# Patient Record
Sex: MALE | Race: WHITE | Employment: OTHER | URBAN - NONMETROPOLITAN AREA
[De-identification: names, ages, dates, MRNs, and addresses within clinical notes are randomized per-mention and may not be internally consistent; named-entity substitution may affect disease eponyms.]

---

## 2018-02-12 ENCOUNTER — HOSPITAL ENCOUNTER (OUTPATIENT)
Dept: GENERAL RADIOLOGY | Age: 48
Discharge: HOME | End: 2018-02-14

## 2018-02-12 ENCOUNTER — OFFICE VISIT (OUTPATIENT)
Dept: PRIMARY CARE CLINIC | Age: 48
End: 2018-02-12

## 2018-02-12 VITALS
HEIGHT: 66 IN | BODY MASS INDEX: 32.14 KG/M2 | DIASTOLIC BLOOD PRESSURE: 80 MMHG | WEIGHT: 200 LBS | HEART RATE: 88 BPM | SYSTOLIC BLOOD PRESSURE: 130 MMHG | TEMPERATURE: 98.8 F | OXYGEN SATURATION: 97 %

## 2018-02-12 DIAGNOSIS — S69.92XA INJURY OF LEFT RING FINGER, INITIAL ENCOUNTER: ICD-10-CM

## 2018-02-12 DIAGNOSIS — S62.625A CLOSED DISPLACED FRACTURE OF MIDDLE PHALANX OF LEFT RING FINGER, INITIAL ENCOUNTER: Primary | ICD-10-CM

## 2018-02-12 PROCEDURE — 73130 X-RAY EXAM OF HAND: CPT

## 2018-02-12 PROCEDURE — 99213 OFFICE O/P EST LOW 20 MIN: CPT | Performed by: NURSE PRACTITIONER

## 2018-02-12 RX ORDER — PRAVASTATIN SODIUM 20 MG
20 TABLET ORAL
COMMUNITY
Start: 2017-12-05 | End: 2021-08-16

## 2018-02-12 ASSESSMENT — ENCOUNTER SYMPTOMS: RESPIRATORY NEGATIVE: 1

## 2018-02-12 NOTE — PROGRESS NOTES
[facility]             [address] [place] [address], [place], [address]                        Telephone [phone]             Fax [phone]     Beny Dickson  1970  MRN:  [id number]   Date of visit:  2/12/2018    Subjective:    Beny Dickson is a 56 y.o.  male who presents to [facility] today (2/12/2018) for evaluation of:    Chief Complaint   Patient presents with    Finger Injury     left ring finger. finger stuck in rope. bruised and tender. Hand Injury    The incident occurred 12 to 24 hours ago. The incident occurred at home. Injury mechanism: rope wrapped around left hand fingers and \"jerked\" the ring finger. The pain is present in the left fingers. The quality of the pain is described as aching. The pain is at a severity of 4/10. The pain has been constant since the incident. Associated symptoms comments: Bruising and swelling to left ring finger. Abrasions to top of 2nd, 3rd, and 5th digits. . The symptoms are aggravated by movement. He has tried ice (ibuprofen) for the symptoms. The treatment provided mild relief. He has the following problem list:  There is no problem list on file for this patient. Current medications are:  Current Outpatient Prescriptions   Medication Sig Dispense Refill    pravastatin (PRAVACHOL) 20 MG tablet Take 20 mg by mouth       No current facility-administered medications for this visit. He has No Known Allergies. Collins Spanner He  reports that he has never smoked. He has never used smokeless tobacco.      Objective:    Vitals:    02/12/18 0941   BP: 130/80   Pulse: 88   Temp: 98.8 °F (37.1 °C)   SpO2: 97%     Body mass index is 32.28 kg/m². Review of Systems   Constitutional: Negative. Respiratory: Negative. Cardiovascular: Negative. Musculoskeletal: Positive for joint swelling (left ring finger).        Physical Exam   Constitutional: He is oriented to person, place, and time. He appears well-developed and well-nourished. HENT:   Head: Normocephalic. Eyes: Pupils are equal, round, and reactive to light. Neck: Normal range of motion. Neck supple. Cardiovascular: Normal rate, regular rhythm and normal heart sounds. Pulmonary/Chest: Effort normal and breath sounds normal.   Musculoskeletal:        Left hand: He exhibits decreased range of motion (4th digit), tenderness (4th digit), bony tenderness (4th digit), deformity (4th digit) and swelling (4th digit). He exhibits normal capillary refill. Normal sensation noted. Normal strength noted. Hands:  Neurological: He is alert and oriented to person, place, and time. Skin: Skin is warm and dry. Psychiatric: He has a normal mood and affect. His behavior is normal. Thought content normal.   Nursing note and vitals reviewed. Assessment and Plan:    1. Closed displaced fracture of middle phalanx of left ring finger, initial encounter  [id number] Velázquez MD, [facility] [place]   2. Injury of left ring finger, initial encounter  XR HAND LEFT (MIN 3 VIEWS)     Splint applied to 4th digit of left hand. Take Tylenol or ibuprofen as needed for pain. Rest and elevate the affected painful area. Apply cold compresses intermittently as needed. Referral to orthopedics. Follow up with PCP or orthopedics if symptoms do not improve or worsen.         Electronically signed by Farrukh Delarosa NP on 2/12/18 at 9:53 AM

## 2018-02-14 ENCOUNTER — OFFICE VISIT (OUTPATIENT)
Dept: ORTHOPEDIC SURGERY | Age: 48
End: 2018-02-14

## 2018-02-14 VITALS
DIASTOLIC BLOOD PRESSURE: 78 MMHG | HEIGHT: 66 IN | HEART RATE: 65 BPM | SYSTOLIC BLOOD PRESSURE: 138 MMHG | WEIGHT: 200 LBS | BODY MASS INDEX: 32.14 KG/M2

## 2018-02-14 DIAGNOSIS — S62.625A CLOSED DISPLACED FRACTURE OF MIDDLE PHALANX OF LEFT RING FINGER, INITIAL ENCOUNTER: Primary | ICD-10-CM

## 2018-02-14 PROCEDURE — L3927 FO PIP DIP NO JT SPR PRE OTS: HCPCS | Performed by: FAMILY MEDICINE

## 2018-02-14 PROCEDURE — 99203 OFFICE O/P NEW LOW 30 MIN: CPT | Performed by: FAMILY MEDICINE

## 2018-02-14 NOTE — PROGRESS NOTES
disorder. PHYSICAL EXAM:    SKIN:  Intact without rashes, lesions or ulcerations. No obvious deformity or swelling. EYES:  Extraocular muscles intact. MOUTH: Oral mucosa moist.  No perioral lesions. PULM:  Respirations unlabored and regular. VASC:  Capillary refill less than 2 seconds. Hand/Wrist Exam  ROM:  Full flexor and extensor tendon function intact   Finger, hand, and wrist range of motion are Reduced resulting in Decreased range of motion of the left ring finger  Inspection-Deformity: yes  Palpation-Tenderness: Soft tissue swelling and tenderness over the middle phalanx of the left ring finger  There is is not thenar and is not interosseous atrophy. Strength- WNL  NEURO: Radial, ulnar, and median nerves are intact to motor and sensory testing. Two point discrimination is less than six mm. There is not decreased sensation to light touch and pinprick in the median and ulnar nerve distribution. CTS: Tinel's test at the wrist is negative. Flexion compression test negative  Phalen's test is negative. Finkelstein's test is negative. Elbow:  Range of motion and strength about the elbow is intact. Tinel's test is negative at the elbow. Cerv:  Full pain free range of motion with a negative Spurling's test.    RADIOLOGY: No results found. 3 views of the left hand were ordered,  independently visualized by me, and discussed with patient. Findings: Left hand radiographs demonstrate a minimally displaced fracture of the middle phalanx of the left ring finger without intra-articular extension    IMPRESSION:     1.  Closed displaced fracture of middle phalanx of left ring finger, initial encounter        PLAN:   We discussed some of the etiologies and natural histories of     ICD-10-CM ICD-9-CM    1. Closed displaced fracture of middle phalanx of left ring finger, initial encounter S62.625A 816.01      We discussed the various treatment alternatives including anti-inflammatory

## 2018-02-26 DIAGNOSIS — S62.655S: Primary | ICD-10-CM

## 2021-07-28 ENCOUNTER — HOSPITAL ENCOUNTER (OUTPATIENT)
Dept: MRI IMAGING | Age: 51
Discharge: HOME | End: 2021-07-30
Payer: COMMERCIAL

## 2021-07-28 DIAGNOSIS — S83.241A ACUTE MEDIAL MENISCUS TEAR OF RIGHT KNEE, INITIAL ENCOUNTER: ICD-10-CM

## 2021-07-28 PROCEDURE — 73721 MRI JNT OF LWR EXTRE W/O DYE: CPT

## 2021-08-11 DIAGNOSIS — M25.561 RIGHT KNEE PAIN, UNSPECIFIED CHRONICITY: Primary | ICD-10-CM

## 2021-08-16 ENCOUNTER — OFFICE VISIT (OUTPATIENT)
Dept: ORTHOPEDIC SURGERY | Age: 51
End: 2021-08-16
Payer: COMMERCIAL

## 2021-08-16 ENCOUNTER — HOSPITAL ENCOUNTER (OUTPATIENT)
Dept: GENERAL RADIOLOGY | Age: 51
Discharge: HOME | End: 2021-08-18
Payer: COMMERCIAL

## 2021-08-16 VITALS
HEIGHT: 66 IN | DIASTOLIC BLOOD PRESSURE: 76 MMHG | OXYGEN SATURATION: 98 % | BODY MASS INDEX: 32.14 KG/M2 | WEIGHT: 200 LBS | HEART RATE: 74 BPM | SYSTOLIC BLOOD PRESSURE: 120 MMHG

## 2021-08-16 DIAGNOSIS — M25.561 RIGHT KNEE PAIN, UNSPECIFIED CHRONICITY: ICD-10-CM

## 2021-08-16 DIAGNOSIS — M25.00 HEMARTHROSIS: Primary | ICD-10-CM

## 2021-08-16 PROCEDURE — 73562 X-RAY EXAM OF KNEE 3: CPT

## 2021-08-16 PROCEDURE — 99203 OFFICE O/P NEW LOW 30 MIN: CPT | Performed by: ORTHOPAEDIC SURGERY

## 2021-08-16 RX ORDER — LISINOPRIL 20 MG/1
20 TABLET ORAL DAILY
COMMUNITY
End: 2021-09-03 | Stop reason: SDUPTHER

## 2021-08-24 ENCOUNTER — HOSPITAL ENCOUNTER (OUTPATIENT)
Dept: LAB | Age: 51
Discharge: HOME | End: 2021-08-24
Payer: COMMERCIAL

## 2021-08-24 ENCOUNTER — OFFICE VISIT (OUTPATIENT)
Dept: ONCOLOGY | Age: 51
End: 2021-08-24
Payer: COMMERCIAL

## 2021-08-24 VITALS
SYSTOLIC BLOOD PRESSURE: 108 MMHG | HEART RATE: 54 BPM | HEIGHT: 66 IN | BODY MASS INDEX: 31.34 KG/M2 | WEIGHT: 195 LBS | OXYGEN SATURATION: 94 % | TEMPERATURE: 97.7 F | DIASTOLIC BLOOD PRESSURE: 70 MMHG

## 2021-08-24 DIAGNOSIS — M25.00 HEMARTHROSIS: Primary | ICD-10-CM

## 2021-08-24 DIAGNOSIS — M25.00 HEMARTHROSIS: ICD-10-CM

## 2021-08-24 LAB
ABSOLUTE EOS #: 0.16 K/UL (ref 0–0.44)
ABSOLUTE IMMATURE GRANULOCYTE: 0.03 K/UL (ref 0–0.3)
ABSOLUTE LYMPH #: 1.85 K/UL (ref 1.1–3.7)
ABSOLUTE MONO #: 0.67 K/UL (ref 0.1–1.2)
ABSOLUTE RETIC #: 0.12 M/UL (ref 0.03–0.08)
ALBUMIN SERPL-MCNC: 4.7 G/DL (ref 3.5–5.2)
ALBUMIN/GLOBULIN RATIO: 1.9 (ref 1–2.5)
ALP BLD-CCNC: 62 U/L (ref 40–129)
ALT SERPL-CCNC: 47 U/L (ref 5–41)
ANION GAP SERPL CALCULATED.3IONS-SCNC: 9 MMOL/L (ref 9–17)
AST SERPL-CCNC: 23 U/L
BASOPHILS # BLD: 1 % (ref 0–2)
BASOPHILS ABSOLUTE: 0.05 K/UL (ref 0–0.2)
BILIRUB SERPL-MCNC: 0.43 MG/DL (ref 0.3–1.2)
BUN BLDV-MCNC: 13 MG/DL (ref 6–20)
BUN/CREAT BLD: 15 (ref 9–20)
CALCIUM SERPL-MCNC: 9.4 MG/DL (ref 8.6–10.4)
CHLORIDE BLD-SCNC: 104 MMOL/L (ref 98–107)
CO2: 23 MMOL/L (ref 20–31)
CREAT SERPL-MCNC: 0.87 MG/DL (ref 0.7–1.2)
DIFFERENTIAL TYPE: ABNORMAL
EOSINOPHILS RELATIVE PERCENT: 3 % (ref 1–4)
GFR AFRICAN AMERICAN: >60 ML/MIN
GFR NON-AFRICAN AMERICAN: >60 ML/MIN
GFR SERPL CREATININE-BSD FRML MDRD: ABNORMAL ML/MIN/{1.73_M2}
GFR SERPL CREATININE-BSD FRML MDRD: ABNORMAL ML/MIN/{1.73_M2}
GLUCOSE BLD-MCNC: 107 MG/DL (ref 70–99)
HCT VFR BLD CALC: 41.3 % (ref 40.7–50.3)
HEMOGLOBIN: 13.8 G/DL (ref 13–17)
IMMATURE GRANULOCYTES: 1 %
IMMATURE RETIC FRACT: 11.9 % (ref 2.7–18.3)
LYMPHOCYTES # BLD: 31 % (ref 24–43)
MCH RBC QN AUTO: 31.2 PG (ref 25.2–33.5)
MCHC RBC AUTO-ENTMCNC: 33.4 G/DL (ref 25.2–33.5)
MCV RBC AUTO: 93.2 FL (ref 82.6–102.9)
MONOCYTES # BLD: 11 % (ref 3–12)
NRBC AUTOMATED: 0 PER 100 WBC
PDW BLD-RTO: 11.9 % (ref 11.8–14.4)
PLATELET # BLD: 252 K/UL (ref 138–453)
PLATELET ESTIMATE: ABNORMAL
PMV BLD AUTO: 8.7 FL (ref 8.1–13.5)
POTASSIUM SERPL-SCNC: 4.4 MMOL/L (ref 3.7–5.3)
RBC # BLD: 4.43 M/UL (ref 4.21–5.77)
RBC # BLD: ABNORMAL 10*6/UL
RETIC %: 2.7 % (ref 0.5–1.9)
RETIC HEMOGLOBIN: 36.5 PG (ref 28.2–35.7)
SEG NEUTROPHILS: 53 % (ref 36–65)
SEGMENTED NEUTROPHILS ABSOLUTE COUNT: 3.18 K/UL (ref 1.5–8.1)
SODIUM BLD-SCNC: 136 MMOL/L (ref 135–144)
TOTAL PROTEIN: 7.2 G/DL (ref 6.4–8.3)
WBC # BLD: 5.9 K/UL (ref 3.5–11.3)
WBC # BLD: ABNORMAL 10*3/UL

## 2021-08-24 PROCEDURE — 85613 RUSSELL VIPER VENOM DILUTED: CPT

## 2021-08-24 PROCEDURE — 85246 CLOT FACTOR VIII VW ANTIGEN: CPT

## 2021-08-24 PROCEDURE — 85245 CLOT FACTOR VIII VW RISTOCTN: CPT

## 2021-08-24 PROCEDURE — 86147 CARDIOLIPIN ANTIBODY EA IG: CPT

## 2021-08-24 PROCEDURE — 36415 COLL VENOUS BLD VENIPUNCTURE: CPT

## 2021-08-24 PROCEDURE — 99204 OFFICE O/P NEW MOD 45 MIN: CPT | Performed by: INTERNAL MEDICINE

## 2021-08-24 PROCEDURE — 85610 PROTHROMBIN TIME: CPT

## 2021-08-24 PROCEDURE — 85045 AUTOMATED RETICULOCYTE COUNT: CPT

## 2021-08-24 PROCEDURE — 85730 THROMBOPLASTIN TIME PARTIAL: CPT

## 2021-08-24 PROCEDURE — 80053 COMPREHEN METABOLIC PANEL: CPT

## 2021-08-24 PROCEDURE — 85025 COMPLETE CBC W/AUTO DIFF WBC: CPT

## 2021-08-24 PROCEDURE — 85240 CLOT FACTOR VIII AHG 1 STAGE: CPT

## 2021-08-24 PROCEDURE — 85635 REPTILASE TEST: CPT

## 2021-08-25 LAB — SURGICAL PATHOLOGY REPORT: NORMAL

## 2021-08-25 NOTE — PROGRESS NOTES
Michael Mcnulty                                                                                                                  8/24/2021  MRN:   [id number]  YOB: 1970  PCP:                           Mayte Loyd MD  Referring Physician: No ref. provider found  Treating Physician Name: Duy Back MD      Reason for consultation:  Chief Complaint   Patient presents with    New Patient     Hemarthrosis right knee      Current problems:  Right knee hemarthrosis    Active and recent treatments:  Work-up in progress    Summary of Case/History:    Michael Mcnulty a 53 y.o.male is a patient with right knee hemarthrosis presents to the clinic to establish care and for further work-up and evaluation. Patient underwent right knee arthroplasty for a meniscal tear about 3 years ago. Patient recovery from the surgery was prolonged due to knee swelling and bleeding into the knee post surgery. Patient more recently started noticing swelling of his right knee again and underwent arthrocentesis which revealed bloody effusion. Subsequently patient is referred to the clinic to rule out any bleeding disorder. Patient states that his sister was told that she has a bleeding disorder and received some form of medication to prevent bleeding before surgery. Patient cannot give details about the condition his sister has. Patient has been active throughout his life he has had minor surgeries done including circumcision visit on tube extraction without any extensive bleeding. Patient denies bloody urine bleeding from his gums. At the time of evaluation he does not have any bruises. However he does feel that he bleeds more when he cuts himself. Denies any other episodes of bleeding into joints. Denies any bleeding into the joints with minimal trauma. Patient does not take NSAIDs typically. Past Medical History:   History reviewed. No pertinent past medical history.     Past Surgical History:   History reviewed. No pertinent surgical history. Patient Family Social History:     Social History     Socioeconomic History    Marital status:      Spouse name: None    Number of children: None    Years of education: None    Highest education level: None   Occupational History    None   Tobacco Use    Smoking status: Never Smoker    Smokeless tobacco: Never Used   Substance and Sexual Activity    Alcohol use: None    Drug use: None    Sexual activity: None   Other Topics Concern    None   Social History Narrative    None     Social Determinants of Health     Financial Resource Strain:     Difficulty of Paying Living Expenses:    Food Insecurity:     Worried About Running Out of Food in the Last Year:     Ran Out of Food in the Last Year:    Transportation Needs:     Lack of Transportation (Medical):  Lack of Transportation (Non-Medical):    Physical Activity:     Days of Exercise per Week:     Minutes of Exercise per Session:    Stress:     Feeling of Stress :    Social Connections:     Frequency of Communication with Friends and Family:     Frequency of Social Gatherings with Friends and Family:     Attends [employer] Services:     Active Member of Clubs or Organizations:     Attends Club or Organization Meetings:     Marital Status:    Intimate Partner Violence:     Fear of Current or Ex-Partner:     Emotionally Abused:     Physically Abused:     Sexually Abused:      History reviewed. No pertinent family history. Current Medications:     Current Outpatient Medications   Medication Sig Dispense Refill    lisinopril (PRINIVIL;ZESTRIL) 20 MG tablet Take 20 mg by mouth daily       No current facility-administered medications for this visit. Allergies:   Patient has no known allergies. Review of Systems:    Constitutional: No fever or chills.  No night sweats, no weight loss   Eyes: No eye discharge, double vision, or eye pain   HEENT: negative for sore mouth, sore throat, hoarseness and voice change   Respiratory: negative for cough , sputum, dyspnea, wheezing, hemoptysis, chest pain   Cardiovascular: negative for chest pain, dyspnea, palpitations, orthopnea, PND   Gastrointestinal: negative for nausea, vomiting, diarrhea, constipation, abdominal pain, Dysphagia, hematemesis and hematochezia   Genitourinary: negative for frequency, dysuria, nocturia, urinary incontinence, and hematuria   Integument: negative for rash, skin lesions, bruises. Hematologic/Lymphatic: negative for easy bruising, bleeding, lymphadenopathy, or petechiae   Endocrine: negative for heat or cold intolerance,weight changes, change in bowel habits and hair loss   Musculoskeletal: Positive for right knee swelling and pain  Neurological: negative for headaches, dizziness, seizures, weakness, numbness        Physical Exam:    Vitals: /70 (Site: Right Upper Arm, Position: Sitting, Cuff Size: Large Adult)   Pulse 54   Temp 97.7 °F (36.5 °C)   Ht 5' 6\" (1.676 m)   Wt 195 lb (88.5 kg)   SpO2 94%   BMI 31.47 kg/m²   General appearance - well appearing, no in pain or distress  Mental status - AAO X3  Eyes - pupils equal and reactive, extraocular eye movements intact  Mouth - mucous membranes moist, pharynx normal without lesions  Neck - supple, no significant adenopathy  Lymphatics - no palpable lymphadenopathy, no hepatosplenomegaly  Chest - clear to auscultation, no wheezes, rales or rhonchi, symmetric air entry  Heart - normal rate, regular rhythm, normal S1, S2, no murmurs  Abdomen - soft, nontender, nondistended, no masses or organomegaly  Neurological - alert, oriented, normal speech, no focal findings or movement disorder noted  Extremities -negative edema.   Right knee swelling  Skin - normal coloration and turgor, no rashes, no suspicious skin lesions noted       DATA:    CBC:   Recent Labs     08/24/21  0957   WBC 5.9   HGB 13.8        BMP:    Recent Labs     08/24/21  0957    K 4.4      CO2 23   BUN 13   CREATININE 0.87   GLUCOSE 107*     Hepatic:   Recent Labs     08/24/21  0957   AST 23   ALT 47*   BILITOT 0.43   ALKPHOS 62     INR:   Recent Labs     08/24/21  0957   INR 1.0     PTT:No results for input(s): PTT in the last 72 hours. Impression:  Recurrent right knee hemarthrosis    Plan:  I had a detailed discussion with the patient and personally went over results of lab work-up imaging studies and other relevant clinical data. Reviewed records from outside facility. Patient recently underwent arthrocentesis with finding of bloody effusion. Discussed differential diagnosis of hemarthrosis Which can be due to trauma bleeding disorders osteoarthritis neurological disorder vascular disorder as well as tumors. Patient underwent MRI of the right knee but did not show any tumor but showed complex tear involving horn of the medial meniscus showed a large joint effusion. Otherwise patient does not give any history of bleeding disorder he has had minor surgeries including circumcision as well as wisdom tooth extraction without any complications. Upon evaluation he does not have any bruises. Does not have any other history of bloody effusion in other joints. Does not take blood thinners. Clinically I suspect patient's recurrent right knee hemarthrosis is more likely due to local structural etiology as opposed to bleeding disorder however given history of possible bleeding disorder in patient's sister I will proceed with work-up including screening studies for coagulopathy as well as reptilase test.  I will also obtain testing for von Willebrand disease and platelet function assay. We will see patient back in office with results of the above test to discuss further treatment plan in the meanwhile I also strongly encouraged the patient to get in touch with assistant find further details regarding the diagnosis of her condition.   The patient and his wife expressed understanding the plan and were in agreement. Giana Marcelo MD      I spent more than 60 minutes examining, evaluating, reviewing data, counseling the patient and coordinating care. Greater than 50% of time was spent face-to-face with the patient this note is created with the assistance of a speech recognition program.  While intending to generate a document that actually reflects the content of the visit, the document can still have some errors including those of syntax and sound a like substitutions which may escape proof reading. It such instances, actual meaning can be extrapolated by contextual diversion.

## 2021-08-26 LAB
PATHOLOGIST REVIEW: NORMAL
REPTILASE TIME: 20.2 SEC
REPTILASE(CORRECTED): NORMAL SEC
RISTOCETIN CO-FACTOR: 97 % (ref 51–215)
VON WILLEBRAND AG: 123 % (ref 52–214)

## 2021-08-31 LAB
ANTICARDIOLIPIN IGA ANTIBODY: 2.1 APL (ref 0–14)
ANTICARDIOLIPIN IGG ANTIBODY: <0.5 GPL (ref 0–10)
CARDIOLIPIN AB IGM: <0.8 MPL (ref 0–10)
DILUTE RUSSELL VIPER VENOM TIME: NORMAL
FACTOR VIII ACTIVITY: 148 % (ref 50–150)
INR BLD: 1
LUPUS ANTICOAG: NORMAL
PARTIAL THROMBOPLASTIN TIME: 26.4 SEC (ref 20.5–30.5)
PROTHROMBIN TIME: 11 SEC (ref 9.1–12.3)

## 2021-09-03 ENCOUNTER — OFFICE VISIT (OUTPATIENT)
Dept: ONCOLOGY | Age: 51
End: 2021-09-03
Payer: COMMERCIAL

## 2021-09-03 VITALS
SYSTOLIC BLOOD PRESSURE: 126 MMHG | BODY MASS INDEX: 31.63 KG/M2 | HEART RATE: 70 BPM | DIASTOLIC BLOOD PRESSURE: 80 MMHG | HEIGHT: 66 IN | WEIGHT: 196.8 LBS | OXYGEN SATURATION: 98 % | TEMPERATURE: 98.6 F

## 2021-09-03 DIAGNOSIS — I10 HYPERTENSION, UNSPECIFIED TYPE: Primary | ICD-10-CM

## 2021-09-03 PROCEDURE — 99213 OFFICE O/P EST LOW 20 MIN: CPT | Performed by: INTERNAL MEDICINE

## 2021-09-03 RX ORDER — LISINOPRIL 20 MG/1
20 TABLET ORAL DAILY
Qty: 30 TABLET | Refills: 0 | Status: SHIPPED | OUTPATIENT
Start: 2021-09-03 | End: 2021-09-16 | Stop reason: SDUPTHER

## 2021-09-03 NOTE — PROGRESS NOTES
Bishop Kasper                                                                                                                  9/3/2021  MRN:   [id number]  YOB: 1970  PCP:                           Beni Aviles MD  Referring Physician: No ref. provider found  Treating Physician Name: Giana Marcelo MD      Reason for visit:  Chief Complaint   Patient presents with    Follow-up     Hemarthrosis, review labs     Current problems:  Right knee hemarthrosis    Summary of Case/History:    Bishop Kasper a 53 y.o.male is a patient with right knee hemarthrosis presents to the clinic to establish care and for further work-up and evaluation. Patient underwent right knee arthroplasty for a meniscal tear about 3 years ago. Patient recovery from the surgery was prolonged due to knee swelling and bleeding into the knee post surgery. Patient more recently started noticing swelling of his right knee again and underwent arthrocentesis which revealed bloody effusion. Subsequently patient is referred to the clinic to rule out any bleeding disorder. Patient states that his sister was told that she has a bleeding disorder and received some form of medication to prevent bleeding before surgery. Patient cannot give details about the condition his sister has. Patient has been active throughout his life he has had minor surgeries done including circumcision visit on tube extraction without any extensive bleeding. Patient denies bloody urine bleeding from his gums. At the time of evaluation he does not have any bruises. However he does feel that he bleeds more when he cuts himself. Denies any other episodes of bleeding into joints. Denies any bleeding into the joints with minimal trauma. Patient does not take NSAIDs typically. Interim History:    Patient presents to the clinic for a follow-up visit and to discuss results of his lab work-up and other relevant clinical data.   Still complains of pain in his knee. Denies any bleeding episode. Past Medical History:   arthritis     Past Surgical History:  Knee arthroplasty     Patient Family Social History:     Social History     Socioeconomic History    Marital status:      Spouse name: None    Number of children: None    Years of education: None    Highest education level: None   Occupational History    None   Tobacco Use    Smoking status: Never Smoker    Smokeless tobacco: Never Used   Substance and Sexual Activity    Alcohol use: None    Drug use: None    Sexual activity: None   Other Topics Concern    None   Social History Narrative    None     Social Determinants of Health     Financial Resource Strain:     Difficulty of Paying Living Expenses:    Food Insecurity:     Worried About Running Out of Food in the Last Year:     Ran Out of Food in the Last Year:    Transportation Needs:     Lack of Transportation (Medical):  Lack of Transportation (Non-Medical):    Physical Activity:     Days of Exercise per Week:     Minutes of Exercise per Session:    Stress:     Feeling of Stress :    Social Connections:     Frequency of Communication with Friends and Family:     Frequency of Social Gatherings with Friends and Family:     Attends [employer] Services:     Active Member of Clubs or Organizations:     Attends Club or Organization Meetings:     Marital Status:    Intimate Partner Violence:     Fear of Current or Ex-Partner:     Emotionally Abused:     Physically Abused:     Sexually Abused:      No family history on file. Current Medications:     Current Outpatient Medications   Medication Sig Dispense Refill    lisinopril (PRINIVIL;ZESTRIL) 20 MG tablet Take 20 mg by mouth daily       No current facility-administered medications for this visit. Allergies:   Patient has no known allergies. Review of Systems:    Constitutional: No fever or chills.  No night sweats, no weight loss   Eyes: No eye discharge, hospital encounter of 08/24/21   Von Willebrand Antigen   Result Value Ref Range    Von Willebrand Ag 123 52 - 214 %   Factor 8 Ristocetin Cofactor   Result Value Ref Range    Ristocetin Co-Factor 97 51 - 215 %   Reptilase Time   Result Value Ref Range    Reptilase Tm 20.2 <=21.9 sec    Reptilase(corrected) Not Applicable <=24.5 sec   Comprehensive Metabolic Panel   Result Value Ref Range    Glucose 107 (H) 70 - 99 mg/dL    BUN 13 6 - 20 mg/dL    CREATININE 0.87 0.70 - 1.20 mg/dL    Bun/Cre Ratio 15 9 - 20    Calcium 9.4 8.6 - 10.4 mg/dL    Sodium 136 135 - 144 mmol/L    Potassium 4.4 3.7 - 5.3 mmol/L    Chloride 104 98 - 107 mmol/L    CO2 23 20 - 31 mmol/L    Anion Gap 9 9 - 17 mmol/L    Alkaline Phosphatase 62 40 - 129 U/L    ALT 47 (H) 5 - 41 U/L    AST 23 <40 U/L    Total Bilirubin 0.43 0.3 - 1.2 mg/dL    Total Protein 7.2 6.4 - 8.3 g/dL    Albumin 4.7 3.5 - 5.2 g/dL    Albumin/Globulin Ratio 1.9 1.0 - 2.5    GFR Non-African American >60 >60 mL/min    GFR African American >60 >60 mL/min    GFR Comment          GFR Staging NOT REPORTED    Path Review, Smear   Result Value Ref Range    Pathologist Review       Reviewed by pathologist:  Binta Ramirez.  ANGIE Jauregui   CBC Auto Differential   Result Value Ref Range    WBC 5.9 3.5 - 11.3 k/uL    RBC 4.43 4.21 - 5.77 m/uL    Hemoglobin 13.8 13.0 - 17.0 g/dL    Hematocrit 41.3 40.7 - 50.3 %    MCV 93.2 82.6 - 102.9 fL    MCH 31.2 25.2 - 33.5 pg    MCHC 33.4 25.2 - 33.5 g/dL    RDW 11.9 11.8 - 14.4 %    Platelets 307 749 - 746 k/uL    MPV 8.7 8.1 - 13.5 fL    NRBC Automated 0.0 0.0 per 100 WBC    Differential Type NOT REPORTED     Seg Neutrophils 53 36 - 65 %    Lymphocytes 31 24 - 43 %    Monocytes 11 3 - 12 %    Eosinophils % 3 1 - 4 %    Basophils 1 0 - 2 %    Immature Granulocytes 1 (H) 0 %    Segs Absolute 3.18 1.50 - 8.10 k/uL    Absolute Lymph # 1.85 1.10 - 3.70 k/uL    Absolute Mono # 0.67 0.10 - 1.20 k/uL    Absolute Eos # 0.16 0.00 - 0.44 k/uL    Basophils Absolute 0.05 0.00 - 0.20 k/uL    Absolute Immature Granulocyte 0.03 0.00 - 0.30 k/uL    WBC Morphology NOT REPORTED     RBC Morphology NOT REPORTED     Platelet Estimate NOT REPORTED    Factor 8 Assay   Result Value Ref Range    Factor VIII Activity 148 50 - 150 %   Lupus Anticoagulant   Result Value Ref Range    Anticardiolipin IgA 2.1 0.0 - 14.0 APL    Anticardiolipin IgG <0.5 0.0 - 10.0 GPL    Cardiolipin Ab IgM <0.8 0.0 - 10.0 MPL    Dilute Viper Venom Time NEGATIVE for Lupus Anticoagulant NEGATIVE for Lupus Anticoagulant    Lupus Anticoag NOT REPORTED     Protime 11.0 9.1 - 12.3 sec    INR 1.0     PTT 26.4 20.5 - 30.5 sec   Reticulocytes   Result Value Ref Range    Retic % 2.7 (H) 0.5 - 1.9 %    Absolute Retic # 0.120 (H) 0.030 - 0.080 M/uL    Immature Retic Fract 11.900 2.7 - 18.3 %    Retic Hemoglobin 36.5 (H) 28.2 - 35.7 pg   Surgical Pathology   Result Value Ref Range    Surgical Pathology Report       [id number]  [facility] [employer] [facility] [employer] [facility]  [facility]  ANATOMIC PATHOLOGY  [address] [place] [address], [place] [address]. [place], [address]  [phone]  Fax: [phone]  SURGICAL PATHOLOGY CONSULTATION    Patient Name: Ravin Nelson  MR#: [id number]  Specimen #[id number]    Procedures/Addenda  PERIPHERAL BLOOD REPORT     Date Ordered:     8/25/2021     Status:  Signed Out       Date Complete:     8/25/2021     By: Susannah Palafox. Juan Carlos Waters D.O. Date Reported:     8/25/2021       INTERPRETATION  PERIPHERAL BLOOD:  RED BLOOD CELL COUNTS WITHIN THE REFERENCE RANGE WITH NORMAL  MORPHOLOGY  WHITE BLOOD CELLS SHOW NORMAL MORPHOLOGY  PLATELETS SHOW NORMAL MORPHOLOGY    RESULTS-COMMENTS  PERIPHERAL BLOOD STUDY    CBC: Please see the electronic health record for CBC parameters  ([id number], 08/24/2021, 09:57). PLATELETS: Platelets show normal morphology. LEUKOCYTES: White blood cells show normal morphology. There are no  blasts. ERYTHROCYTES: Red blood cells brenton w normal morphology. Note: The electronic health record is reviewed. Neelima Caba D.O. Source:  1: PERIPHERAL BLOOD FOR REVIEW BY PATHOLOGIST         XR KNEE RIGHT (3 VIEWS)    Result Date: 8/16/2021  EXAMINATION: THREE XRAY VIEWS OF THE RIGHT KNEE 8/16/2021 8:32 am COMPARISON: July 27, 2021 right knee series HISTORY: ORDERING SYSTEM PROVIDED HISTORY: Right knee pain, unspecified chronicity TECHNOLOGIST PROVIDED HISTORY: pain Reason for Exam: right knee pain x 2 weeks, hx of torn meniscus Acuity: Chronic Type of Exam: Initial FINDINGS: There is minimal patellofemoral and medial tibiofemoral joint space compromise with minimal anterior patellar spurring There is a small joint effusion There is no definite fracture, dislocation, more significant degenerative/erosive change, radiopaque foreign body or bony destructive lesion     Minimal bicompartmental degenerative changes Small joint effusion       Impression:  Recurrent right knee hemarthrosis    Plan:  I had a detailed discussion with the patient and personally went over results of lab work-up imaging studies and other relevant clinical data. Reviewed results of lab work-up which is not suggestive of any bleeding disorder. Clinically I do not believe patient has a bleeding diathesis. Okay to proceed with knee procedure from hematology oncology standpoint    Discussed differential diagnosis of hemarthrosis Which can be due to trauma bleeding disorders osteoarthritis neurological disorder vascular disorder as well as tumors. Patient underwent MRI of the right knee but did not show any tumor but showed complex tear involving horn of the medial meniscus showed a large joint effusion. Otherwise patient does not give any history of bleeding disorder he has had minor surgeries including circumcision as well as wisdom tooth extraction without any complications. Upon evaluation he does not have any bruises.   Does not have any other history of bloody effusion in other joints. Does not take blood thinners. Clinically I suspect patient's recurrent right knee hemarthrosis is more likely due to local structural etiology as opposed to bleeding disorder    We will see patient back in office on as-needed basis    Ananya Guevara MD      This note is created with the assistance of a speech recognition program.  While intending to generate a document that actually reflects the content of the visit, the document can still have some errors including those of syntax and sound a like substitutions which may escape proof reading. It such instances, actual meaning can be extrapolated by contextual diversion.   N.

## 2021-09-13 ENCOUNTER — OFFICE VISIT (OUTPATIENT)
Dept: ORTHOPEDIC SURGERY | Age: 51
End: 2021-09-13
Payer: COMMERCIAL

## 2021-09-13 VITALS
DIASTOLIC BLOOD PRESSURE: 78 MMHG | HEIGHT: 66 IN | SYSTOLIC BLOOD PRESSURE: 124 MMHG | BODY MASS INDEX: 31.34 KG/M2 | HEART RATE: 78 BPM | WEIGHT: 195 LBS

## 2021-09-13 DIAGNOSIS — M17.10 ARTHRITIS OF KNEE: Primary | ICD-10-CM

## 2021-09-13 PROCEDURE — 99213 OFFICE O/P EST LOW 20 MIN: CPT | Performed by: ORTHOPAEDIC SURGERY

## 2021-09-13 PROCEDURE — 20610 DRAIN/INJ JOINT/BURSA W/O US: CPT | Performed by: ORTHOPAEDIC SURGERY

## 2021-09-13 RX ORDER — LIDOCAINE HYDROCHLORIDE 10 MG/ML
2 INJECTION, SOLUTION INFILTRATION; PERINEURAL ONCE
Status: COMPLETED | OUTPATIENT
Start: 2021-09-13 | End: 2021-09-13

## 2021-09-13 RX ORDER — BUPIVACAINE HYDROCHLORIDE 5 MG/ML
2 INJECTION, SOLUTION PERINEURAL ONCE
Status: COMPLETED | OUTPATIENT
Start: 2021-09-13 | End: 2021-09-13

## 2021-09-13 RX ORDER — METHYLPREDNISOLONE ACETATE 40 MG/ML
40 INJECTION, SUSPENSION INTRA-ARTICULAR; INTRALESIONAL; INTRAMUSCULAR; SOFT TISSUE ONCE
Status: COMPLETED | OUTPATIENT
Start: 2021-09-13 | End: 2021-09-13

## 2021-09-13 RX ADMIN — METHYLPREDNISOLONE ACETATE 40 MG: 40 INJECTION, SUSPENSION INTRA-ARTICULAR; INTRALESIONAL; INTRAMUSCULAR; SOFT TISSUE at 09:37

## 2021-09-13 RX ADMIN — BUPIVACAINE HYDROCHLORIDE 10 MG: 5 INJECTION, SOLUTION PERINEURAL at 09:36

## 2021-09-13 RX ADMIN — LIDOCAINE HYDROCHLORIDE 2 ML: 10 INJECTION, SOLUTION INFILTRATION; PERINEURAL at 09:37

## 2021-09-13 NOTE — PROGRESS NOTES
Orthopedic Office Note  [facility] [address]  [address] [facility], [address]  [facility] [address] [phone]      CHIEF COMPLAINT:    Chief Complaint   Patient presents with    Knee Pain     rech right knee       HISTORY OF PRESENT ILLNESS:      The patient is a 53 y.o. male  who presents today for follow-up of his right knee hemarthrosis and osteoarthritis. He is seen the hematologist who work him up for a bleeding disorder which was normal.  Office notes from August 24 of this year and September of this year from Dr. Bret Hills were reviewed. He continues to have right knee pain although significantly improved from his previous visit. Past Medical History:    History reviewed. No pertinent past medical history. Past Surgical History:    History reviewed. No pertinent surgical history. Medications Prior to Admission:   Current Outpatient Medications   Medication Sig Dispense Refill    lisinopril (PRINIVIL;ZESTRIL) 20 MG tablet Take 1 tablet by mouth daily 30 tablet 0     No current facility-administered medications for this visit. Allergies:  Patient has no known allergies. Social History:   Social History     Tobacco Use   Smoking Status Never Smoker   Smokeless Tobacco Never Used     Social History     Substance and Sexual Activity   Alcohol Use None     Social History     Substance and Sexual Activity   Drug Use Not on file       Family History:  History reviewed. No pertinent family history. REVIEW OF SYSTEMS:  Please see the ROS form attached to today's encounter. I have reviewed it with the patient during the visit. All other systems were reviewed and are negative. PHYSICAL EXAM:  Exam today of his right knee reveals no joint effusion. He has some mild joint line tenderness. Has good knee range of motion. Skin is intact. There is no erythema or warmth. Radiology:      ASSESSMENT/PLAN:  1.  Right knee pain, unspecified chronicity        Treatment options were discussed with the patient. He has elected to proceed with a right knee corticosteroid injection. This was performed today in clinic with 1 mL of Depo-Medrol under sterile conditions he tolerated this well. This would be a second injection of a steroid within this past year and I have discussed if this wears off the near future then we would plan to proceed with viscosupplementation injections. He is in agreement with this treatment plan. No orders of the defined types were placed in this encounter.        Marybeth Ricci MD

## 2021-09-16 ENCOUNTER — OFFICE VISIT (OUTPATIENT)
Dept: FAMILY MEDICINE CLINIC | Age: 51
End: 2021-09-16
Payer: COMMERCIAL

## 2021-09-16 ENCOUNTER — HOSPITAL ENCOUNTER (OUTPATIENT)
Age: 51
Setting detail: SPECIMEN
Discharge: HOME | End: 2021-09-16
Payer: COMMERCIAL

## 2021-09-16 ENCOUNTER — HOSPITAL ENCOUNTER (OUTPATIENT)
Dept: LAB | Age: 51
Discharge: HOME | End: 2021-09-16
Payer: COMMERCIAL

## 2021-09-16 VITALS
HEART RATE: 76 BPM | HEIGHT: 66 IN | RESPIRATION RATE: 18 BRPM | BODY MASS INDEX: 31.85 KG/M2 | WEIGHT: 198.2 LBS | DIASTOLIC BLOOD PRESSURE: 72 MMHG | SYSTOLIC BLOOD PRESSURE: 134 MMHG

## 2021-09-16 DIAGNOSIS — I10 HYPERTENSION, UNSPECIFIED TYPE: ICD-10-CM

## 2021-09-16 DIAGNOSIS — R19.7 DIARRHEA, UNSPECIFIED TYPE: Primary | ICD-10-CM

## 2021-09-16 DIAGNOSIS — R19.7 DIARRHEA, UNSPECIFIED TYPE: ICD-10-CM

## 2021-09-16 LAB
C-REACTIVE PROTEIN: <3 MG/L (ref 0–5)
CHOLESTEROL/HDL RATIO: 8.4
CHOLESTEROL: 293 MG/DL
HDLC SERPL-MCNC: 35 MG/DL
LDL CHOLESTEROL DIRECT: 161 MG/DL
LDL CHOLESTEROL: ABNORMAL MG/DL (ref 0–130)
LIPASE: 45 U/L (ref 13–60)
SEDIMENTATION RATE, ERYTHROCYTE: 2 MM (ref 0–15)
TRIGL SERPL-MCNC: 869 MG/DL
VLDLC SERPL CALC-MCNC: ABNORMAL MG/DL (ref 1–30)

## 2021-09-16 PROCEDURE — 85651 RBC SED RATE NONAUTOMATED: CPT

## 2021-09-16 PROCEDURE — 80061 LIPID PANEL: CPT

## 2021-09-16 PROCEDURE — 87210 SMEAR WET MOUNT SALINE/INK: CPT

## 2021-09-16 PROCEDURE — 86140 C-REACTIVE PROTEIN: CPT

## 2021-09-16 PROCEDURE — 82043 UR ALBUMIN QUANTITATIVE: CPT

## 2021-09-16 PROCEDURE — 82570 ASSAY OF URINE CREATININE: CPT

## 2021-09-16 PROCEDURE — 87324 CLOSTRIDIUM AG IA: CPT

## 2021-09-16 PROCEDURE — 87449 NOS EACH ORGANISM AG IA: CPT

## 2021-09-16 PROCEDURE — 36415 COLL VENOUS BLD VENIPUNCTURE: CPT

## 2021-09-16 PROCEDURE — 87328 CRYPTOSPORIDIUM AG IA: CPT

## 2021-09-16 PROCEDURE — 87329 GIARDIA AG IA: CPT

## 2021-09-16 PROCEDURE — 83690 ASSAY OF LIPASE: CPT

## 2021-09-16 PROCEDURE — 87015 SPECIMEN INFECT AGNT CONCNTJ: CPT

## 2021-09-16 PROCEDURE — 99204 OFFICE O/P NEW MOD 45 MIN: CPT | Performed by: NURSE PRACTITIONER

## 2021-09-16 PROCEDURE — 87209 SMEAR COMPLEX STAIN: CPT

## 2021-09-16 PROCEDURE — 83721 ASSAY OF BLOOD LIPOPROTEIN: CPT

## 2021-09-16 RX ORDER — LISINOPRIL 20 MG/1
20 TABLET ORAL DAILY
Qty: 90 TABLET | Refills: 3 | Status: SHIPPED | OUTPATIENT
Start: 2021-09-16

## 2021-09-16 ASSESSMENT — PATIENT HEALTH QUESTIONNAIRE - PHQ9
2. FEELING DOWN, DEPRESSED OR HOPELESS: 0
SUM OF ALL RESPONSES TO PHQ QUESTIONS 1-9: 0
1. LITTLE INTEREST OR PLEASURE IN DOING THINGS: 0
SUM OF ALL RESPONSES TO PHQ9 QUESTIONS 1 & 2: 0

## 2021-09-16 ASSESSMENT — ENCOUNTER SYMPTOMS
VOMITING: 0
ORTHOPNEA: 0
BLOATING: 1
ABDOMINAL PAIN: 0
BLURRED VISION: 0
SHORTNESS OF BREATH: 0
DIARRHEA: 1

## 2021-09-16 NOTE — PATIENT INSTRUCTIONS
Will order lab work  Stool samples to rule out infectious processes  I will call with results, most likely you will need a colonoscopy.

## 2021-09-16 NOTE — PROGRESS NOTES
Occurrences:   1     Standing Expiration Date:   9/16/2022    Sedimentation Rate     Standing Status:   Future     Number of Occurrences:   1     Standing Expiration Date:   9/16/2022    Lipid Panel     Standing Status:   Future     Number of Occurrences:   1     Standing Expiration Date:   9/16/2022     Order Specific Question:   Is Patient Fasting?/# of Hours     Answer:   8hours    Microalbumin, Ur     Standing Status:   Future     Standing Expiration Date:   9/16/2022      Outpatient Encounter Medications as of 9/16/2021   Medication Sig Dispense Refill    lisinopril (PRINIVIL;ZESTRIL) 20 MG tablet Take 1 tablet by mouth daily 90 tablet 3    [DISCONTINUED] lisinopril (PRINIVIL;ZESTRIL) 20 MG tablet Take 1 tablet by mouth daily 30 tablet 0     No facility-administered encounter medications on file as of 9/16/2021.             Sujatha Pritchard, APRN - CNP

## 2021-09-17 DIAGNOSIS — R19.7 DIARRHEA, UNSPECIFIED TYPE: ICD-10-CM

## 2021-09-17 DIAGNOSIS — I10 HYPERTENSION, UNSPECIFIED TYPE: ICD-10-CM

## 2021-09-17 LAB
C DIFF AG + TOXIN: NEGATIVE
CREATININE URINE: 100.2 MG/DL (ref 39–259)
Lab: NORMAL
MICRO OVA & PARASITES: NORMAL
MICROALBUMIN/CREAT 24H UR: <12 MG/L
MICROALBUMIN/CREAT UR-RTO: NORMAL MCG/MG CREAT
SPECIMEN DESCRIPTION: NORMAL
SPECIMEN DESCRIPTION: NORMAL

## 2021-09-20 LAB
DIRECT EXAM: NORMAL
DIRECT EXAM: NORMAL
Lab: NORMAL
SPECIMEN DESCRIPTION: NORMAL

## 2021-09-22 DIAGNOSIS — E78.2 MIXED HYPERTRIGLYCERIDEMIA: Primary | ICD-10-CM

## 2021-09-22 DIAGNOSIS — R19.7 DIARRHEA, UNSPECIFIED TYPE: ICD-10-CM

## 2021-09-22 RX ORDER — FENOFIBRATE 145 MG/1
145 TABLET, COATED ORAL DAILY
Qty: 90 TABLET | Refills: 1 | Status: SHIPPED | OUTPATIENT
Start: 2021-09-22 | End: 2022-04-04

## 2021-10-01 ENCOUNTER — TELEPHONE (OUTPATIENT)
Dept: ORTHOPEDIC SURGERY | Age: 51
End: 2021-10-01

## 2021-10-01 DIAGNOSIS — M25.561 RIGHT KNEE PAIN, UNSPECIFIED CHRONICITY: Primary | ICD-10-CM

## 2021-10-01 DIAGNOSIS — M17.10 ARTHRITIS OF KNEE: ICD-10-CM

## 2021-10-01 NOTE — TELEPHONE ENCOUNTER
Spoke to [facility] to do a prior Loretta Hock for synvisc. They denied because he has not  tried two different meds (tramadol, tylenol or cymbalta). He has tried tylenol but they wanted him to have tried two. He needs to try physical therapy in order for them to pay for synvisc. Spoke to patient he is willing to try physical therapy, then we will resubmit. He will get this scheduled, and call us when he has completed there    Spoke to patient today 10-5-21. He is not happy that he has to try physical therapy. He first stated he was not going to do therapy, because it is not going to help. Told patient it is his decision, but this is what the insurance company is requiring. He then stated he would try it. Will await to hear from him.

## 2021-10-06 ENCOUNTER — INITIAL CONSULT (OUTPATIENT)
Dept: SURGERY | Age: 51
End: 2021-10-06
Payer: COMMERCIAL

## 2021-10-06 VITALS
HEART RATE: 99 BPM | WEIGHT: 198 LBS | DIASTOLIC BLOOD PRESSURE: 80 MMHG | BODY MASS INDEX: 31.82 KG/M2 | OXYGEN SATURATION: 96 % | SYSTOLIC BLOOD PRESSURE: 140 MMHG | HEIGHT: 66 IN | TEMPERATURE: 97.9 F

## 2021-10-06 DIAGNOSIS — R19.7 DIARRHEA, UNSPECIFIED TYPE: Primary | ICD-10-CM

## 2021-10-06 PROCEDURE — 99214 OFFICE O/P EST MOD 30 MIN: CPT | Performed by: SURGERY

## 2021-10-06 NOTE — PROGRESS NOTES
General Surgery History & Physical  Marleny Wright MD  Pt Name: Michelle Beltran  MRN: [id number]  [place]: 1970  Date of evaluation: 10/6/2021  Primary Care Physician: Everitt Severin, APRN - CNP    Patient evaluated at the request of Terrell Grullon  Reason for evaluation: diarrhea       SUBJECTIVE:  Chief Complaint:   Chief Complaint   Patient presents with    Diarrhea     3-4 daily loose-since july-never colonoscopy     End of July tore right knee meniscus. Then next day went to  [facility] off blood from need. Put on pain med. Then in 2 days developed severe cramping with urgency then symptoms would go away. Very soft and watery. No normal stools since then , no blood. No ass with food. Has had episodes at night. No one else in family has not sx's. No heartburn, no weight loss. Colonoscopy  Abd pain: no, but cramping at the time of stools  Anemia: no  Bloating:no  Diarrhea: yes, 3-4 loose stools daily  Constipation: no  Melena: no  Hematochezia:no  Rectal Bleeding:no  Rectal/Anal Pain:no  Pruritus: no  Family history colon Cancer: no  Previous colon cancer: no  Previous Colon Polyp: no  Change in bowels: no  Decrease caliber of stool: no  Change in color of stool: no  Previous work up date: none    Past Medical History   has a past medical history of Acute diarrhea, Hyperlipidemia, Hypertension, and Knee pain. Past Surgical History   has a past surgical history that includes Knee cartilage surgery (Right) and [place] tooth extraction. Family History  family history includes Cancer in his father. Social History  Tobacco use:  reports that he has quit smoking. He has never used smokeless tobacco.  Alcohol use:  reports current alcohol use. Drug use:  reports no history of drug use.       Medications  Current Medications:   Current Outpatient Medications   Medication Sig Dispense Refill    fenofibrate (TRICOR) 145 MG tablet Take 1 tablet by mouth daily 90 tablet 1    lisinopril (PRINIVIL;ZESTRIL) 20 MG tablet Take 1 tablet by mouth daily 90 tablet 3     No current facility-administered medications for this visit. Home Medications:   Prior to Admission medications    Medication Sig Start Date End Date Taking? Authorizing Provider   fenofibrate (TRICOR) 145 MG tablet Take 1 tablet by mouth daily 9/22/21   ERIN Jeffers CNP   lisinopril (PRINIVIL;ZESTRIL) 20 MG tablet Take 1 tablet by mouth daily 9/16/21   WayERIN Schultz CNP       Allergies  Patient has no known allergies. Review of Systems:  General: Denies any fever, chills. HEENT: Denies any diplopia, tinnitus or vertigo. Respiratory: Denies any shortness of breath or cough. Cardiac: Denies any chest pain, palpitations, claudication or edema. Gastrointestinal: Denies any melena, hematochezia, hematemesis or pyrosis. Genitourinary: Denies any frequency, urgency, hesitancy or incontinence. Hematologic: Denies bruising or bleeding easily. Endocrine: Denies any history of diabetes or thyroid disease. PHYSICAL EXAMINATION  Vitals:   Vitals:    10/06/21 1332   BP: (!) 140/80   Pulse: 99   Temp: 97.9 °F (36.6 °C)   SpO2: 96%     General Appearance:  awake, alert, oriented, in no acute distress, well developed, well nourished and in no acute distress  Skin:  Skin color, texture, turgor normal. No rashes or lesions. Head/face:  NCAT  Eyes:  No gross abnormalities. , PERRL, Pupils- PERRL. Ears:  canals and TMs NI and External- normal  Nose/Sinuses:  Nares normal. Septum midline. Mucosa normal. No drainage or sinus tenderness. Mouth/Throat:  Mucosa moist.  No lesions. Pharynx without erythema, edema or exudate. Lungs:  Normal expansion. Clear to auscultation. No rales, rhonchi, or wheezing., Breathing Pattern: regular, no distress, Breath sounds: normal  Heart:  Heart sounds are normal.  Regular rate and rhythm without murmur, gallop or rub. Auscultation: Normal S1 and S2.  Regular rhythm.  No murmurs, gallops, or rubs. Abdomen:  Soft, non-tender, normal bowel sounds. No bruits, organomegaly or masses. Musculoskeletal:  negative, negative findings: no erythema, induration, or nodules, ROM of all joints is normal, strength normal  Neurologic:  negative findings: proximal muscle strength normal, speech normal, mental status intact, cranial nerves 2-12 intact, muscle tone normal, muscle strength normal    LABS:  CBC   Lab Results   Component Value Date    WBC 5.9 08/24/2021    HGB 13.8 08/24/2021    HCT 41.3 08/24/2021     08/24/2021     BMP   Lab Results   Component Value Date     08/24/2021    K 4.4 08/24/2021     08/24/2021    CO2 23 08/24/2021    BUN 13 08/24/2021    CREATININE 0.87 08/24/2021     LFT's:   Lab Results   Component Value Date    AST 23 08/24/2021    ALT 47 08/24/2021    ALKPHOS 62 08/24/2021    BILITOT 0.43 08/24/2021    LIPASE 45 09/16/2021     COAGS:   Lab Results   Component Value Date    INR 1.0 08/24/2021     Lipids:   Lab Results   Component Value Date    CHOL 293 09/16/2021    HDL 35 09/16/2021    LDLCHOLESTEROL      09/16/2021    CHOLHDLRATIO 8.4 09/16/2021    TRIG 869 09/16/2021    VLDL NOT REPORTED 09/16/2021       RADIOLOGY:  All images reviewed and within normal limits unless otherwise specified: No    IMPRESSIONS:  1. Cramping and urgency to bm after eating. 2.  Occasional epigastric pain    This could be IBS but need to rule out IBD, polyps, cancer, other colitis and Upper GI issues. Surgical Risk: low to moderate risk    PLAN:  1. EGD and CS  2. GB US    Medical Decision Making: moderate complexity    Thank you for the interesting evaluation. Further recommendations to follow.     Aurora Sanchez MD  Electronically signed 10/6/2021 at 3:21 PM

## 2021-10-07 ENCOUNTER — HOSPITAL ENCOUNTER (OUTPATIENT)
Dept: INTERVENTIONAL RADIOLOGY/VASCULAR | Age: 51
Discharge: HOME | End: 2021-10-09

## 2021-10-07 DIAGNOSIS — R19.7 DIARRHEA, UNSPECIFIED TYPE: ICD-10-CM

## 2021-10-07 PROCEDURE — 76705 ECHO EXAM OF ABDOMEN: CPT

## 2021-11-03 ENCOUNTER — PRE-PROCEDURE TELEPHONE (OUTPATIENT)
Dept: PREADMISSION TESTING | Age: 51
End: 2021-11-03

## 2021-11-03 NOTE — TELEPHONE ENCOUNTER
Spoke with patient and confirmed a covid swab appt for Nov 18th at 0900. Instructions provided, verbalizes understanding.

## 2021-11-18 ENCOUNTER — HOSPITAL ENCOUNTER (OUTPATIENT)
Dept: PREADMISSION TESTING | Age: 51
Setting detail: SPECIMEN
Discharge: HOME | End: 2021-11-22
Payer: COMMERCIAL

## 2021-11-18 DIAGNOSIS — Z11.59 ENCOUNTER FOR SCREENING FOR OTHER VIRAL DISEASES: Primary | ICD-10-CM

## 2021-11-18 PROCEDURE — U0005 INFEC AGEN DETEC AMPLI PROBE: HCPCS

## 2021-11-18 PROCEDURE — U0003 INFECTIOUS AGENT DETECTION BY NUCLEIC ACID (DNA OR RNA); SEVERE ACUTE RESPIRATORY SYNDROME CORONAVIRUS 2 (SARS-COV-2) (CORONAVIRUS DISEASE [COVID-19]), AMPLIFIED PROBE TECHNIQUE, MAKING USE OF HIGH THROUGHPUT TECHNOLOGIES AS DESCRIBED BY CMS-2020-01-R: HCPCS

## 2021-11-19 LAB
SARS-COV-2: NORMAL
SARS-COV-2: NOT DETECTED
SOURCE: NORMAL

## 2021-11-23 ENCOUNTER — ANESTHESIA EVENT (OUTPATIENT)
Dept: OPERATING ROOM | Age: 51
End: 2021-11-23
Payer: COMMERCIAL

## 2021-11-23 ENCOUNTER — ANESTHESIA (OUTPATIENT)
Dept: OPERATING ROOM | Age: 51
End: 2021-11-23
Payer: COMMERCIAL

## 2021-11-23 ENCOUNTER — HOSPITAL ENCOUNTER (OUTPATIENT)
Age: 51
Setting detail: OUTPATIENT SURGERY
Discharge: HOME | End: 2021-11-23
Attending: SURGERY | Admitting: SURGERY
Payer: COMMERCIAL

## 2021-11-23 VITALS
TEMPERATURE: 97 F | BODY MASS INDEX: 30.7 KG/M2 | HEART RATE: 59 BPM | DIASTOLIC BLOOD PRESSURE: 55 MMHG | OXYGEN SATURATION: 99 % | HEIGHT: 66 IN | SYSTOLIC BLOOD PRESSURE: 110 MMHG | WEIGHT: 191 LBS | RESPIRATION RATE: 16 BRPM

## 2021-11-23 VITALS — OXYGEN SATURATION: 99 % | SYSTOLIC BLOOD PRESSURE: 144 MMHG | DIASTOLIC BLOOD PRESSURE: 83 MMHG

## 2021-11-23 PROCEDURE — 45380 COLONOSCOPY AND BIOPSY: CPT | Performed by: SURGERY

## 2021-11-23 PROCEDURE — 43239 EGD BIOPSY SINGLE/MULTIPLE: CPT | Performed by: SURGERY

## 2021-11-23 PROCEDURE — 88342 IMHCHEM/IMCYTCHM 1ST ANTB: CPT

## 2021-11-23 PROCEDURE — 2580000003 HC RX 258: Performed by: SURGERY

## 2021-11-23 PROCEDURE — 3609010300 HC COLONOSCOPY W/BIOPSY SINGLE/MULTIPLE: Performed by: SURGERY

## 2021-11-23 PROCEDURE — 3700000000 HC ANESTHESIA ATTENDED CARE: Performed by: SURGERY

## 2021-11-23 PROCEDURE — 2580000003 HC RX 258: Performed by: NURSE ANESTHETIST, CERTIFIED REGISTERED

## 2021-11-23 PROCEDURE — 7100000011 HC PHASE II RECOVERY - ADDTL 15 MIN: Performed by: SURGERY

## 2021-11-23 PROCEDURE — 6360000002 HC RX W HCPCS: Performed by: NURSE ANESTHETIST, CERTIFIED REGISTERED

## 2021-11-23 PROCEDURE — 2709999900 HC NON-CHARGEABLE SUPPLY: Performed by: SURGERY

## 2021-11-23 PROCEDURE — 2500000003 HC RX 250 WO HCPCS: Performed by: NURSE ANESTHETIST, CERTIFIED REGISTERED

## 2021-11-23 PROCEDURE — 88305 TISSUE EXAM BY PATHOLOGIST: CPT

## 2021-11-23 PROCEDURE — 3700000001 HC ADD 15 MINUTES (ANESTHESIA): Performed by: SURGERY

## 2021-11-23 PROCEDURE — 7100000010 HC PHASE II RECOVERY - FIRST 15 MIN: Performed by: SURGERY

## 2021-11-23 PROCEDURE — 3609012400 HC EGD TRANSORAL BIOPSY SINGLE/MULTIPLE: Performed by: SURGERY

## 2021-11-23 RX ORDER — SODIUM CHLORIDE, SODIUM LACTATE, POTASSIUM CHLORIDE, CALCIUM CHLORIDE 600; 310; 30; 20 MG/100ML; MG/100ML; MG/100ML; MG/100ML
INJECTION, SOLUTION INTRAVENOUS CONTINUOUS
Status: DISCONTINUED | OUTPATIENT
Start: 2021-11-23 | End: 2021-11-23 | Stop reason: HOSPADM

## 2021-11-23 RX ORDER — SODIUM CHLORIDE, SODIUM LACTATE, POTASSIUM CHLORIDE, CALCIUM CHLORIDE 600; 310; 30; 20 MG/100ML; MG/100ML; MG/100ML; MG/100ML
INJECTION, SOLUTION INTRAVENOUS CONTINUOUS PRN
Status: DISCONTINUED | OUTPATIENT
Start: 2021-11-23 | End: 2021-11-23 | Stop reason: SDUPTHER

## 2021-11-23 RX ORDER — LIDOCAINE HYDROCHLORIDE 40 MG/ML
INJECTION, SOLUTION RETROBULBAR; TOPICAL PRN
Status: DISCONTINUED | OUTPATIENT
Start: 2021-11-23 | End: 2021-11-23 | Stop reason: SDUPTHER

## 2021-11-23 RX ORDER — PROPOFOL 10 MG/ML
INJECTION, EMULSION INTRAVENOUS PRN
Status: DISCONTINUED | OUTPATIENT
Start: 2021-11-23 | End: 2021-11-23 | Stop reason: SDUPTHER

## 2021-11-23 RX ADMIN — LIDOCAINE HYDROCHLORIDE 120 MG: 40 INJECTION, SOLUTION RETROBULBAR; TOPICAL at 10:24

## 2021-11-23 RX ADMIN — PROPOFOL 330 MG: 10 INJECTION, EMULSION INTRAVENOUS at 10:24

## 2021-11-23 RX ADMIN — SODIUM CHLORIDE, POTASSIUM CHLORIDE, SODIUM LACTATE AND CALCIUM CHLORIDE: 600; 310; 30; 20 INJECTION, SOLUTION INTRAVENOUS at 10:23

## 2021-11-23 RX ADMIN — SODIUM CHLORIDE, POTASSIUM CHLORIDE, SODIUM LACTATE AND CALCIUM CHLORIDE: 600; 310; 30; 20 INJECTION, SOLUTION INTRAVENOUS at 09:56

## 2021-11-23 ASSESSMENT — PAIN SCALES - GENERAL
PAINLEVEL_OUTOF10: 0

## 2021-11-23 ASSESSMENT — PAIN - FUNCTIONAL ASSESSMENT: PAIN_FUNCTIONAL_ASSESSMENT: 0-10

## 2021-11-23 NOTE — OP NOTE
PROCEDURE NOTE    DATE OF PROCEDURE: 11/23/2021     SURGEON: Dr. Zackary Chavez    ASSISTANT: None    PREOPERATIVE DIAGNOSIS:  1. Cramping and urgency to bm after eating. 2.  Occasional epigastric pain        OPERATION: 1. Upper GI endoscopy with cold biopsy    2. Colonoscopy with cold forceps    ANESTHESIA: IV sedation per CRNA.     ESTIMATED BLOOD LOSS: Less than 10 ml    COMPLICATIONS: None. PROCEDURE # 1:  EGD    PROCEDURE: The patient was given IV conscious sedation per anesthesia. The patient was given 4 L of O2 /minute by nasal cannula. The patient's SPO2 remained above 90% throughout the procedure. The gastroscope was inserted orally and advanced under direct vision through the esophagus, through the stomach, through the pylorus, and into the descending duodenum. Random biopsies were taken in the antrum,body and distal esophagus. The scope was removed and the patient tolerated the procedure well. Findings:    1.  GE junction at 38 cm  2. Mild esophagitis      PROCEDURE # 2:  COLONOSCOPY      PROCEDURE: The patient was given IV conscious sedation per anesthesia. The patient was given O2 by nasal cannula. The patient's SPO2 remained above 90% throughout the procedure. The colonoscope was inserted per rectum and advanced under direct vision to the cecum without difficulty. The prep was good so exam was adequate. The colon was decompressed and the scope was removed. The patient tolerated the procedure well. Findings:    1. Mild diffuse erythema, rule inflammation  2. Random bx's taken to rule out microscopic colitis. At right x2, transverse, left , sigmoid and rectum. PLAN:  1. Await bx then make further recommendations    ADDENDUM:  Endo Review :  12/6/2021    Pathology:    Diagnosis --   1.  GASTRIC ANTRUM, BIOPSY:   -GASTRIC ANTRAL MUCOSA WITH MILD TO MODERATE CHRONIC GASTRITIS.   -H. PYLORI IMMUNOSTAIN NEGATIVE.  CONTROL REACTS AS EXPECTED.      2.  GASTRIC BODY, BIOPSY: -PATCHY MILD CHRONIC GASTRITIS. 3.  DISTAL ESOPHAGUS, BIOPSY:   -SQUAMOCOLUMNAR MUCOSA WITH INTESTINAL METAPLASIA, NEGATIVE FOR   DYSPLASIA.  SEE COMMENT. 4.  RIGHT COLON IRRITATION #1, BIOPSY:   -LYMPHOCYTIC COLITIS. 5.  RIGHT COLON IRRITATION #2, BIOPSY:   -LYMPHOCYTIC COLITIS. 6.  TRANSVERSE COLON IRRITATION, BIOPSY:   -LYMPHOCYTIC COLITIS. 7.  LEFT COLON IRRITATION, BIOPSY:   -LYMPHOCYTIC COLITIS. 8.  SIGMOID COLON IRRITATION, BIOPSY:   -LYMPHOCYTIC COLITIS. 9.  RECTUM IRRITATION, BIOPSY:   -LYMPHOCYTIC COLITIS. -- Diagnosis Comment --   3.  IF THIS BIOPSY IS FROM AN ENDOSCOPIC TONGUE OF COLUMNAR MUCOSA   EXTENDING ABOVE THE GE JUNCTION, THEN IT IS SELLERS'S ESOPHAGUS. OTHERWISE IT IS GASTRIC CARDIA MUCOSA THAT CONTAINS GOBLET CELLS. Plan:    1. Pt had lymphocytic colitis, a microscopic colitis. Will tx with entocort 9 mg x 6 weeks then follow up with me in 4-5 weeks to see progress and to determine if we can wean down at that time.                 Electronically signed by Shashank Simmons MD  on 11/23/2021 at 10:17 AM

## 2021-11-23 NOTE — H&P
General Surgery History & Physical  Marbin Woodard MD  Pt Name: Trent Vogt  MRN: [id number]  [place]: 1970  Date of evaluation: 10/6/2021  Primary Care Physician: ERIN Landry CNP    Patient evaluated at the request of Amanda Waite  Reason for evaluation: diarrhea       SUBJECTIVE:  Chief Complaint:   No chief complaint on file. End of July tore right knee meniscus. Then next day went to  [facility]. Put on pain med. Then in 2 days developed severe cramping with urgency then symptoms would go away. Very soft and watery. No normal stools since then , no blood. No ass with food. Has had episodes at night. No one else in family has not sx's. No heartburn, no weight loss. Colonoscopy  Abd pain: no, but cramping at the time of stools  Anemia: no  Bloating:no  Diarrhea: yes, 3-4 loose stools daily  Constipation: no  Melena: no  Hematochezia:no  Rectal Bleeding:no  Rectal/Anal Pain:no  Pruritus: no  Family history colon Cancer: no  Previous colon cancer: no  Previous Colon Polyp: no  Change in bowels: no  Decrease caliber of stool: no  Change in color of stool: no  Previous work up date: none    Past Medical History   has a past medical history of Acute diarrhea, Hyperlipidemia, Hypertension, and Knee pain. Past Surgical History   has a past surgical history that includes Knee cartilage surgery (Right) and [place] tooth extraction. Family History  family history includes Cancer in his father. Social History  Tobacco use:  reports that he has quit smoking. He has never used smokeless tobacco.  Alcohol use:  reports current alcohol use. Drug use:  reports no history of drug use.       Medications  Current Medications:   Current Facility-Administered Medications   Medication Dose Route Frequency Provider Last Rate Last Admin    lactated ringers infusion   IntraVENous Continuous Jasmin Waggoner  mL/hr at 11/23/21 0956 New Bag at 11/23/21 0956     Home Medications:   Prior to Admission medications    Medication Sig Start Date End Date Taking? Authorizing Provider   fenofibrate (TRICOR) 145 MG tablet Take 1 tablet by mouth daily 9/22/21  Yes Oral Cyphers, APRN - CNP   lisinopril (PRINIVIL;ZESTRIL) 20 MG tablet Take 1 tablet by mouth daily 9/16/21  Yes Oral Cyphers, APRN - CNP       Allergies  Patient has no known allergies. Review of Systems:  General: Denies any fever, chills. HEENT: Denies any diplopia, tinnitus or vertigo. Respiratory: Denies any shortness of breath or cough. Cardiac: Denies any chest pain, palpitations, claudication or edema. Gastrointestinal: Denies any melena, hematochezia, hematemesis or pyrosis. Genitourinary: Denies any frequency, urgency, hesitancy or incontinence. Hematologic: Denies bruising or bleeding easily. Endocrine: Denies any history of diabetes or thyroid disease. PHYSICAL EXAMINATION  Vitals:   Vitals:    11/23/21 0943   BP: 132/65   Pulse: 50   Resp: 16   Temp: 97.5 °F (36.4 °C)   SpO2: 97%     General Appearance:  awake, alert, oriented, in no acute distress, well developed, well nourished and in no acute distress  Skin:  Skin color, texture, turgor normal. No rashes or lesions. Head/face:  NCAT  Eyes:  No gross abnormalities. , PERRL, Pupils- PERRL. Ears:  canals and TMs NI and External- normal  Nose/Sinuses:  Nares normal. Septum midline. Mucosa normal. No drainage or sinus tenderness. Mouth/Throat:  Mucosa moist.  No lesions. Pharynx without erythema, edema or exudate. Lungs:  Normal expansion. Clear to auscultation. No rales, rhonchi, or wheezing., Breathing Pattern: regular, no distress, Breath sounds: normal  Heart:  Heart sounds are normal.  Regular rate and rhythm without murmur, gallop or rub. Auscultation: Normal S1 and S2.  Regular rhythm. No murmurs, gallops, or rubs. Abdomen:  Soft, non-tender, normal bowel sounds. No bruits, organomegaly or masses.   Musculoskeletal:  negative, negative findings: no erythema, induration, or nodules, ROM of all joints is normal, strength normal  Neurologic:  negative findings: proximal muscle strength normal, speech normal, mental status intact, cranial nerves 2-12 intact, muscle tone normal, muscle strength normal    LABS:  CBC   Lab Results   Component Value Date    WBC 5.9 08/24/2021    HGB 13.8 08/24/2021    HCT 41.3 08/24/2021     08/24/2021     BMP   Lab Results   Component Value Date     08/24/2021    K 4.4 08/24/2021     08/24/2021    CO2 23 08/24/2021    BUN 13 08/24/2021    CREATININE 0.87 08/24/2021     LFT's:   Lab Results   Component Value Date    AST 23 08/24/2021    ALT 47 08/24/2021    ALKPHOS 62 08/24/2021    BILITOT 0.43 08/24/2021    LIPASE 45 09/16/2021     COAGS:   Lab Results   Component Value Date    INR 1.0 08/24/2021     Lipids:   Lab Results   Component Value Date    CHOL 293 09/16/2021    HDL 35 09/16/2021    LDLCHOLESTEROL      09/16/2021    CHOLHDLRATIO 8.4 09/16/2021    TRIG 869 09/16/2021    VLDL NOT REPORTED 09/16/2021       RADIOLOGY:  All images reviewed and within normal limits unless otherwise specified: No    IMPRESSIONS:  1. Cramping and urgency to bm after eating. 2.  Occasional epigastric pain    This could be IBS but need to rule out IBD, polyps, cancer, other colitis and Upper GI issues. Surgical Risk: low to moderate risk    PLAN:  1.  EGD and CS  2. GB US    Medical Decision Making: moderate complexity      Addendum:  US = no gallstones

## 2021-11-23 NOTE — ANESTHESIA POSTPROCEDURE EVALUATION
Department of Anesthesiology  Postprocedure Note    Patient: Erika Bansal  MRN: [id number]  YOB: 1970  Date of evaluation: 11/23/2021  Time:  10:58 AM     Procedure Summary     Date: 11/23/21 Room / Location: [address]    Anesthesia Start: 4759 Anesthesia Stop: 8139    Procedures:       EGD BIOPSY (N/A )      COLONOSCOPY WITH BIOPSY (N/A ) Diagnosis: (diarrhea)    Surgeons: Alexis Donahue MD Responsible Provider: ERIN Wolfe CRNA    Anesthesia Type: general, TIVA ASA Status: 2          Anesthesia Type: general, TIVA    Thania Phase I: Thania Score: 9    Thania Phase II:      Last vitals: Reviewed and per EMR flowsheets.        Anesthesia Post Evaluation    Patient location during evaluation: PACU  Patient participation: complete - patient participated  Level of consciousness: awake and alert  Pain score: 0  Airway patency: patent  Nausea & Vomiting: no nausea and no vomiting  Complications: no  Cardiovascular status: blood pressure returned to baseline and hemodynamically stable  Respiratory status: acceptable  Hydration status: euvolemic

## 2021-11-29 LAB — SURGICAL PATHOLOGY REPORT: NORMAL

## 2022-01-03 ENCOUNTER — TELEPHONE (OUTPATIENT)
Dept: SURGERY | Age: 52
End: 2022-01-03

## 2022-01-03 RX ORDER — BUDESONIDE 3 MG/1
3 CAPSULE, COATED PELLETS ORAL 3 TIMES DAILY
Qty: 180 CAPSULE | Refills: 0 | Status: SHIPPED | OUTPATIENT
Start: 2022-01-03 | End: 2022-03-04

## 2022-01-03 RX ORDER — OMEPRAZOLE 20 MG/1
20 CAPSULE, DELAYED RELEASE ORAL DAILY
Qty: 30 CAPSULE | Refills: 3 | Status: SHIPPED | OUTPATIENT
Start: 2022-01-03 | End: 2022-05-11

## 2022-01-03 NOTE — TELEPHONE ENCOUNTER
Contacted patient to review results from EGD and CS that was completed on 11/23/2021 with Dr. Zeny Mckoy at [facility]. Updated history, health maintenance, and recall. Forwarded results to PCP. Medications (Prilosec 20 mg daily and Entocort 9 mg daily) sent to [address] [place] [address]. Patient scheduled for f/u appt with Dr. Zeny Mckoy.

## 2022-02-10 ENCOUNTER — OFFICE VISIT (OUTPATIENT)
Dept: SURGERY | Age: 52
End: 2022-02-10
Payer: COMMERCIAL

## 2022-02-10 VITALS
WEIGHT: 203 LBS | DIASTOLIC BLOOD PRESSURE: 80 MMHG | HEIGHT: 66 IN | HEART RATE: 72 BPM | BODY MASS INDEX: 32.62 KG/M2 | SYSTOLIC BLOOD PRESSURE: 126 MMHG

## 2022-02-10 DIAGNOSIS — K52.9 COLITIS: Primary | ICD-10-CM

## 2022-02-10 PROCEDURE — 99212 OFFICE O/P EST SF 10 MIN: CPT | Performed by: SURGERY

## 2022-02-10 RX ORDER — BUDESONIDE 3 MG/1
3 CAPSULE, COATED PELLETS ORAL 2 TIMES DAILY
Qty: 60 CAPSULE | Refills: 3 | Status: SHIPPED | OUTPATIENT
Start: 2022-02-10 | End: 2022-03-12

## 2022-02-10 NOTE — PROGRESS NOTES
Patient is here for follow-up after his EGD and colonoscopy. He was found to have lymphocytic colitis. We put him on Entocort 9 mg for 6 weeks. He is here for that follow-up. He states that his urgency and watery stools are much better. He doesn't have any urgency and the stools are soft. He also had Liriano's esophagus. There was no dysplasia. He did have intestinal metaplasia. He has been on Prilosec and a supplement holistic medicine that helps with acid. He would like to stop the Prilosec because he is asymptomatic I think that is reasonable. If his symptoms return then he should go back on it. We will plan an EGD in 1 year to evaluate the newly diagnosed Liriano's. If it changes then he should go back on medicine. He will also call us in 4 weeks to see how his diarrhea or bowel issues are going. If he is doing well on 6 mg of Entocort we might then decrease him down to 3 mg and then try to wean him off of it.   And set up an office visit he would like to call us and do it over the phone and that is fine

## 2022-02-10 NOTE — PATIENT INSTRUCTIONS
May stop Prilosec. If symptoms come back, may re-start. Decrease Entocort to one twice a day for one month. Call our office with status report. If no symptoms, may decrease to once a day. Repeat EGD in one year.

## 2022-04-02 DIAGNOSIS — E78.2 MIXED HYPERTRIGLYCERIDEMIA: ICD-10-CM

## 2022-04-04 RX ORDER — FENOFIBRATE 145 MG/1
TABLET, COATED ORAL
Qty: 90 TABLET | Refills: 3 | Status: SHIPPED | OUTPATIENT
Start: 2022-04-04

## 2022-04-04 NOTE — TELEPHONE ENCOUNTER
[facility] called requesting a refill of the below medication which has been pended for you:     Requested Prescriptions     Pending Prescriptions Disp Refills    fenofibrate (TRICOR) 145 MG tablet [Pharmacy Med Name: FENOFIBRATE 145 MG TABLET] 30 tablet      Sig: TAKE ONE TABLET BY MOUTH DAILY       Last Appointment Date: 9/16/2021  Next Appointment Date: Visit date not found    No Known Allergies

## 2022-05-09 ENCOUNTER — HOSPITAL ENCOUNTER (OUTPATIENT)
Dept: LAB | Age: 52
Discharge: HOME | End: 2022-05-09
Payer: COMMERCIAL

## 2022-05-09 LAB
ABSOLUTE EOS #: 0.15 K/UL (ref 0–0.44)
ABSOLUTE IMMATURE GRANULOCYTE: <0.03 K/UL (ref 0–0.3)
ABSOLUTE LYMPH #: 1.72 K/UL (ref 1.1–3.7)
ABSOLUTE MONO #: 0.5 K/UL (ref 0.1–1.2)
BASOPHILS # BLD: 1 % (ref 0–2)
BASOPHILS ABSOLUTE: 0.06 K/UL (ref 0–0.2)
EOSINOPHILS RELATIVE PERCENT: 3 % (ref 1–4)
HCT VFR BLD CALC: 40 % (ref 40.7–50.3)
HEMOGLOBIN: 13.6 G/DL (ref 13–17)
IMMATURE GRANULOCYTES: 0 %
LYMPHOCYTES # BLD: 37 % (ref 24–43)
MCH RBC QN AUTO: 31.1 PG (ref 25.2–33.5)
MCHC RBC AUTO-ENTMCNC: 34 G/DL (ref 25.2–33.5)
MCV RBC AUTO: 91.5 FL (ref 82.6–102.9)
MONOCYTES # BLD: 11 % (ref 3–12)
NRBC AUTOMATED: 0 PER 100 WBC
PDW BLD-RTO: 11.5 % (ref 11.8–14.4)
PLATELET # BLD: 275 K/UL (ref 138–453)
PMV BLD AUTO: 9.6 FL (ref 8.1–13.5)
RBC # BLD: 4.37 M/UL (ref 4.21–5.77)
SEG NEUTROPHILS: 48 % (ref 36–65)
SEGMENTED NEUTROPHILS ABSOLUTE COUNT: 2.24 K/UL (ref 1.5–8.1)
TESTOSTERONE TOTAL: 245 NG/DL (ref 220–1000)
VITAMIN B-12: 369 PG/ML (ref 232–1245)
VITAMIN D 25-HYDROXY: 29.4 NG/ML
WBC # BLD: 4.7 K/UL (ref 3.5–11.3)

## 2022-05-09 PROCEDURE — 82306 VITAMIN D 25 HYDROXY: CPT

## 2022-05-09 PROCEDURE — 85025 COMPLETE CBC W/AUTO DIFF WBC: CPT

## 2022-05-09 PROCEDURE — 36415 COLL VENOUS BLD VENIPUNCTURE: CPT

## 2022-05-09 PROCEDURE — 82607 VITAMIN B-12: CPT

## 2022-05-09 PROCEDURE — 84403 ASSAY OF TOTAL TESTOSTERONE: CPT

## 2022-05-11 RX ORDER — OMEPRAZOLE 20 MG/1
CAPSULE, DELAYED RELEASE ORAL
Qty: 30 CAPSULE | Refills: 3 | Status: SHIPPED | OUTPATIENT
Start: 2022-05-11

## 2022-09-02 ENCOUNTER — TELEPHONE (OUTPATIENT)
Dept: SURGERY | Age: 52
End: 2022-09-02

## 2022-09-28 NOTE — TELEPHONE ENCOUNTER
Instructions reviewed and sent through [facility] to patient per his request. All questions answered and patient denies any further questions at this time. Patient scheduled for EGD on 11/22/2022 at [facility] with Dr. Gretchen Thornton.

## 2023-01-11 ENCOUNTER — TELEPHONE (OUTPATIENT)
Dept: SURGERY | Age: 53
End: 2023-01-11

## 2023-01-11 NOTE — TELEPHONE ENCOUNTER
Called patient to reschedule EGD due to Dr. oLna Lyon on vacation on 3-21-23. Reschedule patient from 3-21-23 to 3-7-23. [facility] made aware.

## 2023-08-10 ENCOUNTER — TELEPHONE (OUTPATIENT)
Dept: SURGERY | Age: 53
End: 2023-08-10

## 2023-08-10 NOTE — TELEPHONE ENCOUNTER
Received call from jim at [facility], stating patient's wife called to reschedule EGD. Attempted to contact  [phone] unable to reach patient. [place] gave me their work number [phone]. Left message on work number.

## 2023-08-11 NOTE — TELEPHONE ENCOUNTER
Alfredo Whitmore called back regarding scheduling the EGD please give her a call back [phone] and if she doesn't answer try the home phone number

## 2023-08-14 ENCOUNTER — TELEPHONE (OUTPATIENT)
Dept: SURGERY | Age: 53
End: 2023-08-14

## 2023-08-14 RX ORDER — PRAVASTATIN SODIUM 40 MG
40 TABLET ORAL DAILY
COMMUNITY
Start: 2023-06-14

## 2023-08-14 NOTE — TELEPHONE ENCOUNTER
Instructions given and review with the patient. All questions answered and patient denies any further questions at this time. Patient scheduled for EGD on 10-10-23.  Mailed out pre op instructions
diarrhea     Hyperlipidemia     Hypertension     Knee pain      Current Outpatient Medications on File Prior to Visit   Medication Sig Dispense Refill    omeprazole (PRILOSEC) 20 MG delayed release capsule TAKE ONE CAPSULE BY MOUTH DAILY 30 capsule 3    fenofibrate (TRICOR) 145 MG tablet TAKE ONE TABLET BY MOUTH DAILY 90 tablet 3    lisinopril (PRINIVIL;ZESTRIL) 20 MG tablet Take 1 tablet by mouth daily 90 tablet 3     No current facility-administered medications on file prior to visit.      Allergies as of 08/14/2023    (No Known Allergies)           PEx:                ASSESSMENT:        PLAN:EGD

## 2023-10-10 ENCOUNTER — ANESTHESIA (OUTPATIENT)
Dept: OPERATING ROOM | Age: 53
End: 2023-10-10
Payer: COMMERCIAL

## 2023-10-10 ENCOUNTER — ANESTHESIA EVENT (OUTPATIENT)
Dept: OPERATING ROOM | Age: 53
End: 2023-10-10
Payer: COMMERCIAL

## 2023-10-10 ENCOUNTER — HOSPITAL ENCOUNTER (OUTPATIENT)
Age: 53
Setting detail: OUTPATIENT SURGERY
Discharge: HOME | End: 2023-10-10
Attending: SURGERY | Admitting: SURGERY
Payer: COMMERCIAL

## 2023-10-10 VITALS
HEART RATE: 77 BPM | DIASTOLIC BLOOD PRESSURE: 88 MMHG | OXYGEN SATURATION: 100 % | HEIGHT: 66 IN | BODY MASS INDEX: 33.56 KG/M2 | WEIGHT: 208.8 LBS | RESPIRATION RATE: 16 BRPM | TEMPERATURE: 98 F | SYSTOLIC BLOOD PRESSURE: 164 MMHG

## 2023-10-10 DIAGNOSIS — Z87.19 HISTORY OF BARRETT'S ESOPHAGUS: ICD-10-CM

## 2023-10-10 PROCEDURE — 3609012400 HC EGD TRANSORAL BIOPSY SINGLE/MULTIPLE: Performed by: SURGERY

## 2023-10-10 PROCEDURE — 2580000003 HC RX 258: Performed by: SURGERY

## 2023-10-10 PROCEDURE — 2709999900 HC NON-CHARGEABLE SUPPLY: Performed by: SURGERY

## 2023-10-10 PROCEDURE — 7100000010 HC PHASE II RECOVERY - FIRST 15 MIN: Performed by: SURGERY

## 2023-10-10 PROCEDURE — 7100000011 HC PHASE II RECOVERY - ADDTL 15 MIN: Performed by: SURGERY

## 2023-10-10 PROCEDURE — 93005 ELECTROCARDIOGRAM TRACING: CPT | Performed by: NURSE ANESTHETIST, CERTIFIED REGISTERED

## 2023-10-10 PROCEDURE — 3700000001 HC ADD 15 MINUTES (ANESTHESIA): Performed by: SURGERY

## 2023-10-10 PROCEDURE — 88305 TISSUE EXAM BY PATHOLOGIST: CPT

## 2023-10-10 PROCEDURE — 43239 EGD BIOPSY SINGLE/MULTIPLE: CPT | Performed by: SURGERY

## 2023-10-10 PROCEDURE — 6360000002 HC RX W HCPCS: Performed by: NURSE ANESTHETIST, CERTIFIED REGISTERED

## 2023-10-10 PROCEDURE — 2500000003 HC RX 250 WO HCPCS: Performed by: NURSE ANESTHETIST, CERTIFIED REGISTERED

## 2023-10-10 PROCEDURE — 3700000000 HC ANESTHESIA ATTENDED CARE: Performed by: SURGERY

## 2023-10-10 RX ORDER — SODIUM CHLORIDE, SODIUM LACTATE, POTASSIUM CHLORIDE, CALCIUM CHLORIDE 600; 310; 30; 20 MG/100ML; MG/100ML; MG/100ML; MG/100ML
INJECTION, SOLUTION INTRAVENOUS CONTINUOUS
Status: DISCONTINUED | OUTPATIENT
Start: 2023-10-10 | End: 2023-10-10 | Stop reason: HOSPADM

## 2023-10-10 RX ORDER — PROPOFOL 10 MG/ML
INJECTION, EMULSION INTRAVENOUS PRN
Status: DISCONTINUED | OUTPATIENT
Start: 2023-10-10 | End: 2023-10-10 | Stop reason: SDUPTHER

## 2023-10-10 RX ORDER — LIDOCAINE HYDROCHLORIDE 40 MG/ML
INJECTION, SOLUTION RETROBULBAR; TOPICAL PRN
Status: DISCONTINUED | OUTPATIENT
Start: 2023-10-10 | End: 2023-10-10 | Stop reason: SDUPTHER

## 2023-10-10 RX ADMIN — SODIUM CHLORIDE, POTASSIUM CHLORIDE, SODIUM LACTATE AND CALCIUM CHLORIDE: 600; 310; 30; 20 INJECTION, SOLUTION INTRAVENOUS at 07:57

## 2023-10-10 RX ADMIN — LIDOCAINE HYDROCHLORIDE 2 ML: 40 INJECTION, SOLUTION RETROBULBAR; TOPICAL at 08:36

## 2023-10-10 RX ADMIN — PROPOFOL 200 MG: 10 INJECTION, EMULSION INTRAVENOUS at 08:36

## 2023-10-10 ASSESSMENT — PAIN - FUNCTIONAL ASSESSMENT: PAIN_FUNCTIONAL_ASSESSMENT: 0-10

## 2023-10-10 ASSESSMENT — PAIN SCALES - GENERAL
PAINLEVEL_OUTOF10: 0

## 2023-10-10 NOTE — H&P
H &P EGD  Patient:Los Mireles                 :1970  (yes) patient has seen Dr. Xuan Abrams prior to procedure  PCP: Dr. Suzy Birmingham     CC:Liriano's esophagus           HPI:           EGD  Nausea: no  Vomiting: no  Heartburn:no  Dysphagia:no  Hematemesis:no  Epigastric pain:no  Anemia: no  Liriano's esophagus: yes,  new dx  Family History of Liriano's esophagus: no  Previous work up date:EGD by Dr Xuan Abrams on 21 with findings of esophagitis and mild to moderate gastritis, and Liriano's with negative dysplasia  Current Treatment:none     Family History         Family History   Problem Relation Age of Onset    Cancer Father           lyphoma         Social History               Socioeconomic History    Marital status:        Spouse name: Not on file    Number of children: Not on file    Years of education: Not on file    Highest education level: Not on file   Occupational History    Not on file   Tobacco Use    Smoking status: Former    Smokeless tobacco: Never    Tobacco comments:       quit 28 years ago   Vaping Use    Vaping Use: Never used   Substance and Sexual Activity    Alcohol use: Yes    Drug use: Never    Sexual activity: Not on file   Other Topics Concern    Not on file   Social History Narrative    Not on file      Social Determinants of Health      Financial Resource Strain: Not on file   Food Insecurity: Not on file   Transportation Needs: Not on file   Physical Activity: Not on file   Stress: Not on file   Social Connections: Not on file   Intimate Partner Violence: Not on file   Housing Stability: Not on file         Past Surgical History         Past Surgical History:   Procedure Laterality Date    COLONOSCOPY N/A 2021     mild diffuse erythema, biospies showed lymphocytic colitis; Dr. Xuan Abrams at [address] [place] [address] Right      UPPER GASTROINTESTINAL ENDOSCOPY N/A 2021     mild esophagitis, mild to moderate chronic gastritis;  Liriano's esophagus no dysplasia; Dr. Xuan Abrams

## 2023-10-10 NOTE — OP NOTE
EGD PROCEDURE NOTE:      Pre op diagnosis:     1. Hx of cormier's  2. Last EGD 11-23-21 + cormier's = no dysplasia  3. Prilosec 20 mg daily      Operative Procedure:    1. EGD with cold biopsy    Surgeon:    Dr. Primitivo Weber     Anesthesia:    IV sedation per CRNA    EBL:  minimal      Procedure:    Patient was taken to the endoscopy suite and placed in a left lateral decubitus position. They were given IV sedation as mentioned above. The gastric scope was passed through the mouth piece and down the esophagus, stomach and into the second portion of the duodenum. It was withdrawn slowly and cold biopsies were taken in the antrum,body of the stomach and the distal esophagus. The scope was retroflexed in the stomach and GE junction was examined. The following findings were noted. Final Diagnosis:    GE junction at 38 cm  Several tongues of cormier's type tissue up about 3 cm, 4 quadrant bx taken up to 4 cm above the GE junction  Normal stomach and duodenum    Plan:    Await bx  Will then discuss whether or not he needs PUD meds. He is currently not taken any for last year and has no PUD/GERD symptoms. Will also, decide on next barretts surveillance. ADDENDUM:  Endo Review :  10/15/2023    Pathology:    Path Number: [id number]     -- Diagnosis --     A.   STOMACH, BIOPSY:   -MINIMAL CHRONIC INACTIVE GASTRITIS   -BY H&E STAINING, HELICOBACTER PYLORI MICROORGANISMS ARE NOT   IDENTIFIED     B.  STOMACH, BODY, BIOPSY:   -GASTRIC OXYNTIC TYPE MUCOSA WITH NO PATHOLOGIC DIAGNOSIS     C.  GE JUNCTION, BIOPSY:   -COLUMNAR MUCOSA WITH CHRONIC INFLAMMATION   -THERE IS NO INTESTINAL METAPLASIA OR DYSPLASIA IDENTIFIED     D.  ESOPHAGUS, 1 CM ABOVE GE JUNCTION, BIOPSY:   -COLUMNAR MUCOSA WITH INTESTINAL METAPLASIA, CONSISTENT WITH CORMIER'S   ESOPHAGUS IN THE PROPER ENDOSCOPIC SETTING   -NEGATIVE FOR DYSPLASIA     E.  ESOPHAGUS, 2 CM ABOVE GE JUNCTION, BIOPSY:   -COLUMNAR MUCOSA WITH INTESTINAL METAPLASIA, CONSISTENT WITH CORMIER'S   ESOPHAGUS IN THE PROPER ENDOSCOPIC SETTING   -NEGATIVE FOR DYSPLASIA   -BENIGN SQUAMOUS MUCOSA     F.  ESOPHAGUS, 3 CM ABOVE GE JUNCTION, BIOPSY:   -BENIGN SQUAMOUS MUCOSA     G.  ESOPHAGUS, 4 CM ABOVE GE JUNCTION, BIOPSY:   -BENIGN SQUAMOUS MUCOSA     Plan:    1. Pt has no dysplasia . Only one bx with cormier's in it  2. Pt wants to not take his meds , that's ok , but I would recommend he take them once a day.    3.  Repeat EGD in 3 years for follow up of Cormier's

## 2023-10-10 NOTE — DISCHARGE INSTRUCTIONS
Await biopsy results. Dr. Zamora Hint office will call with results in 7-10 days  Will then discuss whether or not he needs Peptic Ulcer Disease medicationss. He is currently not taken any for last year and has no PUD/GERD symptoms. Will also, decide on next barretts surveillance. DISCHARGE INSTRUCTIONS FOLLOWING EGD    Activity  You have received sedation. Your judgement and coordination may be impaired. Do not drive or operate any machinery today. Do not make personal, medical, legal or business decisions today. Do not consume alcohol, tranquilizers or sleeping medications today unless otherwise instructed by your physician. Rest today. You may resume normal activity tomorrow. Because air is put into your stomach during this procedure, it is normal to pass large amounts of air/belch. Feelings of  bloating, gas or cramping may persist for 24 hours. Diet  Begin with sips of clear liquids and if no nausea, you may progress to your normal diet. Medications  You may resume your usual medications, unless otherwise instructed. Follow-Up  As instructed. CALL YOUR PHYSICIAN if you experience any of the following:  Chest pain not relieved by belching. Abdominal pain/cramping and/or distention that is not relieved by passing gas. Persistent nausea & vomiting. Signs of infection including fever, chills, redness or swelling @ the IV site.     If you have questions/problems, call [phone] [facility]) or after regular business hours call [phone] [facility])

## 2023-10-10 NOTE — ANESTHESIA POSTPROCEDURE EVALUATION
Department of Anesthesiology  Postprocedure Note    Patient: Randolph Glover  MRN: [id number]  YOB: 1970  Date of evaluation: 10/10/2023      Procedure Summary     Date: 10/10/23 Room / Location: [facility]    Anesthesia Start: 830 Anesthesia Stop:     Procedure: EGD BIOPSY Diagnosis:       History of Liriano's esophagus      (History of Liriano's esophagus [Z87.19])    Surgeons: Chacorta Leblanc MD Responsible Provider: ERIN Jhaveri CRNA    Anesthesia Type: General, TIVA ASA Status: 2          Anesthesia Type: General, TIVA    Thania Phase I: Thania Score: 10    Thania Phase II: Thania Score: 10      Anesthesia Post Evaluation    Patient location during evaluation: bedside  Patient participation: complete - patient participated  Level of consciousness: awake and alert  Pain score: 0  Airway patency: patent  Nausea & Vomiting: no nausea and no vomiting  Complications: no  Cardiovascular status: blood pressure returned to baseline  Respiratory status: acceptable  Hydration status: euvolemic  Pain management: adequate

## 2023-10-11 LAB — SURGICAL PATHOLOGY REPORT: NORMAL

## 2023-10-12 LAB
EKG ATRIAL RATE: 66 BPM
EKG P AXIS: 49 DEGREES
EKG P-R INTERVAL: 156 MS
EKG Q-T INTERVAL: 410 MS
EKG QRS DURATION: 92 MS
EKG QTC CALCULATION (BAZETT): 429 MS
EKG R AXIS: 31 DEGREES
EKG T AXIS: -167 DEGREES
EKG VENTRICULAR RATE: 66 BPM

## 2023-10-17 ENCOUNTER — TELEPHONE (OUTPATIENT)
Dept: SURGERY | Age: 53
End: 2023-10-17

## 2023-10-17 NOTE — TELEPHONE ENCOUNTER
Letter created and mailed to patient [facility] on 10/10/2023. Updated history. Forwarded results to PCP.

## (undated) DEVICE — BITE BLOCK W/VELCRO STRAP

## (undated) DEVICE — FORCEPS BX L240CM JAW DIA2.4MM ORNG L CAP W/ NDL DISP RAD

## (undated) DEVICE — MERCY DEFIANCE ENDO KIT: Brand: MEDLINE INDUSTRIES, INC.

## (undated) DEVICE — 1200CC GUARDIAN II: Brand: GUARDIAN

## (undated) DEVICE — 60 ML SYRINGE REGULAR TIP: Brand: MONOJECT

## (undated) DEVICE — CO2 CANNULA,SSOFT,ADLT,7O2,4CO2,FEMALE: Brand: MEDLINE

## (undated) DEVICE — CONNECTOR TBNG AUX H2O JET DISP FOR OLY 160/180 SER

## (undated) DEVICE — 4-PORT MANIFOLD: Brand: NEPTUNE 2

## (undated) DEVICE — CANNULA NSL AD L2IN ETCO2 SAMP SFT CRUSH RESIST FEM AIRLFE